# Patient Record
Sex: FEMALE | Race: WHITE | NOT HISPANIC OR LATINO | URBAN - METROPOLITAN AREA
[De-identification: names, ages, dates, MRNs, and addresses within clinical notes are randomized per-mention and may not be internally consistent; named-entity substitution may affect disease eponyms.]

---

## 2017-05-31 ENCOUNTER — IMPORTED ENCOUNTER (OUTPATIENT)
Dept: URBAN - METROPOLITAN AREA CLINIC 9 | Facility: CLINIC | Age: 72
End: 2017-05-31

## 2018-02-27 ENCOUNTER — IMPORTED ENCOUNTER (OUTPATIENT)
Dept: URBAN - METROPOLITAN AREA CLINIC 9 | Facility: CLINIC | Age: 73
End: 2018-02-27

## 2018-05-15 ENCOUNTER — IMPORTED ENCOUNTER (OUTPATIENT)
Dept: URBAN - METROPOLITAN AREA CLINIC 9 | Facility: CLINIC | Age: 73
End: 2018-05-15

## 2019-07-24 ENCOUNTER — IMPORTED ENCOUNTER (OUTPATIENT)
Dept: URBAN - METROPOLITAN AREA CLINIC 9 | Facility: CLINIC | Age: 74
End: 2019-07-24

## 2020-08-04 ENCOUNTER — IMPORTED ENCOUNTER (OUTPATIENT)
Dept: URBAN - METROPOLITAN AREA CLINIC 9 | Facility: CLINIC | Age: 75
End: 2020-08-04

## 2020-08-04 ENCOUNTER — ESTABLISHED PATIENT (OUTPATIENT)
Dept: URBAN - METROPOLITAN AREA CLINIC 14 | Facility: CLINIC | Age: 75
End: 2020-08-04

## 2020-08-04 PROBLEM — H02.831: Noted: 2020-08-04

## 2020-08-04 PROBLEM — H02.834: Noted: 2020-08-04

## 2020-08-04 PROBLEM — E11.9: Noted: 2020-08-04

## 2020-08-04 PROBLEM — H43.813: Noted: 2020-08-04

## 2021-10-03 ASSESSMENT — VISUAL ACUITY
OU_SC: J2
OD_SC: 20/40-2
OS_SC: 20/40-2

## 2021-10-03 ASSESSMENT — TONOMETRY
OS_IOP_MMHG: 13
OD_IOP_MMHG: 11

## 2021-10-07 ENCOUNTER — ESTABLISHED PATIENT (OUTPATIENT)
Dept: URBAN - METROPOLITAN AREA CLINIC 14 | Facility: CLINIC | Age: 76
End: 2021-10-07

## 2021-10-07 DIAGNOSIS — E11.9: ICD-10-CM

## 2021-10-07 DIAGNOSIS — H43.813: ICD-10-CM

## 2021-10-07 DIAGNOSIS — H52.4: ICD-10-CM

## 2021-10-07 DIAGNOSIS — H02.834: ICD-10-CM

## 2021-10-07 DIAGNOSIS — H02.831: ICD-10-CM

## 2021-10-07 PROCEDURE — 92250 FUNDUS PHOTOGRAPHY W/I&R: CPT

## 2021-10-07 PROCEDURE — 92134 CPTRZ OPH DX IMG PST SGM RTA: CPT

## 2021-10-07 PROCEDURE — 99214 OFFICE O/P EST MOD 30 MIN: CPT

## 2021-10-07 ASSESSMENT — VISUAL ACUITY
OS_SC: 20/70-1
OD_SC: 20/40-1
OU_SC: J2

## 2021-10-07 ASSESSMENT — TONOMETRY
OS_IOP_MMHG: 15
OD_IOP_MMHG: 12

## 2021-10-18 ASSESSMENT — TONOMETRY
OD_IOP_MMHG: 11
OD_IOP_MMHG: 16
OS_IOP_MMHG: 15
OD_IOP_MMHG: 16
OS_IOP_MMHG: 12
OD_IOP_MMHG: 11
OS_IOP_MMHG: 18
OS_IOP_MMHG: 13

## 2021-10-18 ASSESSMENT — VISUAL ACUITY
OS_CC: 20/25 SN
OS_SC: 20/50 -2 SN
OS_CC: 20/25 SN
OD_SC: 20/40 -2 SN
OD_SC: 20/40 -2 SN
OS_PH: 20/40 -2 SN
OD_CC: 20/30 + SN
OD_SC: 20/30 -2 SN
OD_SC: 20/25 - SN
OD_SC: 20/25 - SN
OD_CC: 20/25 SN
OS_SC: 20/40 SN
OS_SC: 20/40 -2 SN
OS_PH: 20/30 -2 SN
OD_CC: 20/25 SN
OS_CC: 20/25 -2 SN
OS_SC: 20/60 SN
OS_SC: 20/40 SN

## 2022-06-20 RX ORDER — LEVOCETIRIZINE DIHYDROCHLORIDE 5 MG/1
TABLET, FILM COATED ORAL
COMMUNITY

## 2022-06-20 RX ORDER — ZOLPIDEM TARTRATE 12.5 MG/1
TABLET, FILM COATED, EXTENDED RELEASE ORAL
COMMUNITY
Start: 2022-04-08 | End: 2022-08-02 | Stop reason: SDUPTHER

## 2022-06-20 RX ORDER — METFORMIN HYDROCHLORIDE 750 MG/1
TABLET, EXTENDED RELEASE ORAL
COMMUNITY

## 2022-06-20 RX ORDER — ATORVASTATIN CALCIUM 40 MG/1
TABLET, FILM COATED ORAL
COMMUNITY
End: 2022-08-09 | Stop reason: SINTOL

## 2022-08-03 DIAGNOSIS — F51.01 PRIMARY INSOMNIA: ICD-10-CM

## 2022-08-03 PROBLEM — K64.1 SECOND DEGREE HEMORRHOIDS: Status: ACTIVE | Noted: 2022-08-03

## 2022-08-03 PROBLEM — N28.89 KIDNEY MASS: Status: ACTIVE | Noted: 2022-08-03

## 2022-08-03 PROBLEM — E78.5 HYPERLIPIDEMIA: Status: ACTIVE | Noted: 2022-08-03

## 2022-08-03 PROBLEM — N32.81 OAB (OVERACTIVE BLADDER): Status: ACTIVE | Noted: 2022-08-03

## 2022-08-03 PROBLEM — M75.111 PARTIAL NONTRAUMATIC TEAR OF RIGHT ROTATOR CUFF: Status: ACTIVE | Noted: 2022-08-03

## 2022-08-03 PROBLEM — K21.9 GASTROESOPHAGEAL REFLUX DISEASE: Status: ACTIVE | Noted: 2022-08-03

## 2022-08-03 PROBLEM — R32 INCONTINENCE: Status: ACTIVE | Noted: 2022-08-03

## 2022-08-03 PROBLEM — J31.0 RHINITIS: Status: ACTIVE | Noted: 2022-08-03

## 2022-08-03 PROBLEM — G47.00 INSOMNIA: Status: ACTIVE | Noted: 2022-08-03

## 2022-08-03 PROBLEM — K55.9 ISCHEMIC COLITIS (HCC): Status: ACTIVE | Noted: 2022-08-03

## 2022-08-03 PROBLEM — I48.91 ATRIAL FIBRILLATION (HCC): Status: ACTIVE | Noted: 2022-08-03

## 2022-08-03 PROBLEM — E78.5 DYSLIPIDEMIA: Status: ACTIVE | Noted: 2022-08-03

## 2022-08-03 PROBLEM — N81.89 PELVIC FLOOR WEAKNESS IN FEMALE: Status: ACTIVE | Noted: 2022-08-03

## 2022-08-03 PROBLEM — M19.011 ARTHRITIS OF RIGHT ACROMIOCLAVICULAR JOINT: Status: ACTIVE | Noted: 2022-08-03

## 2022-08-03 PROBLEM — K52.9 COLITIS: Status: ACTIVE | Noted: 2022-08-03

## 2022-08-03 PROBLEM — E78.49 OTHER HYPERLIPIDEMIA: Status: ACTIVE | Noted: 2022-08-03

## 2022-08-03 PROBLEM — E11.9 TYPE 2 DIABETES MELLITUS WITHOUT COMPLICATION (HCC): Status: ACTIVE | Noted: 2022-08-03

## 2022-08-03 PROBLEM — M75.51 BURSITIS OF RIGHT SHOULDER: Status: ACTIVE | Noted: 2022-08-03

## 2022-08-03 PROBLEM — L30.9 ECZEMA: Status: ACTIVE | Noted: 2022-08-03

## 2022-08-03 RX ORDER — AZELASTINE 1 MG/ML
SPRAY, METERED NASAL
COMMUNITY
Start: 2022-07-05

## 2022-08-03 RX ORDER — PROGESTERONE 200 MG/1
CAPSULE ORAL
COMMUNITY
Start: 2022-07-19 | End: 2022-10-28

## 2022-08-03 RX ORDER — ESOMEPRAZOLE MAGNESIUM 40 MG/1
CAPSULE, DELAYED RELEASE ORAL
COMMUNITY

## 2022-08-03 RX ORDER — DIAZEPAM 2 MG/1
TABLET ORAL
COMMUNITY
Start: 2022-07-28 | End: 2022-09-12

## 2022-08-03 RX ORDER — ZOLPIDEM TARTRATE 12.5 MG/1
12.5 TABLET, FILM COATED, EXTENDED RELEASE ORAL NIGHTLY PRN
Qty: 90 TABLET | Refills: 0 | Status: SHIPPED | OUTPATIENT
Start: 2022-08-03 | End: 2022-08-03

## 2022-08-03 RX ORDER — SOLIFENACIN SUCCINATE 5 MG/1
TABLET, FILM COATED ORAL
COMMUNITY
Start: 2022-07-27 | End: 2022-08-09 | Stop reason: SDUPTHER

## 2022-08-03 RX ORDER — ZOLPIDEM TARTRATE 12.5 MG/1
12.5 TABLET, FILM COATED, EXTENDED RELEASE ORAL NIGHTLY PRN
Qty: 90 TABLET | Refills: 0 | Status: SHIPPED | OUTPATIENT
Start: 2022-08-03 | End: 2022-09-12

## 2022-08-03 NOTE — TELEPHONE ENCOUNTER
Received call from [facility], medication Ambien 12.5m needs updated to read 1 tablet at bedtime. Pharmacy received instuctions of 1 tablet in the morning. Needs this correct prior to giving to patient.

## 2022-08-08 PROBLEM — E78.5 DYSLIPIDEMIA: Status: RESOLVED | Noted: 2022-08-03 | Resolved: 2022-08-08

## 2022-08-09 PROBLEM — M54.40 LUMBAGO WITH SCIATICA: Status: ACTIVE | Noted: 2018-06-20

## 2022-08-09 PROBLEM — M51.26 DISPLACEMENT OF LUMBAR INTERVERTEBRAL DISC WITHOUT MYELOPATHY: Status: ACTIVE | Noted: 2018-06-20

## 2022-08-09 PROBLEM — M54.17 LUMBOSACRAL RADICULITIS: Status: ACTIVE | Noted: 2018-06-20

## 2022-08-09 PROBLEM — N28.1 SIMPLE RENAL CYST: Status: ACTIVE | Noted: 2018-06-20

## 2022-08-17 PROBLEM — I48.0 PAROXYSMAL ATRIAL FIBRILLATION (HCC): Status: ACTIVE | Noted: 2022-08-03

## 2022-09-27 ENCOUNTER — ESTABLISHED PATIENT (OUTPATIENT)
Dept: URBAN - METROPOLITAN AREA CLINIC 14 | Facility: CLINIC | Age: 77
End: 2022-09-27

## 2022-09-27 DIAGNOSIS — H01.02B: ICD-10-CM

## 2022-09-27 DIAGNOSIS — E11.9: ICD-10-CM

## 2022-09-27 DIAGNOSIS — D23.122: ICD-10-CM

## 2022-09-27 DIAGNOSIS — D23.112: ICD-10-CM

## 2022-09-27 DIAGNOSIS — H01.02A: ICD-10-CM

## 2022-09-27 PROCEDURE — 99214 OFFICE O/P EST MOD 30 MIN: CPT

## 2022-09-27 PROCEDURE — 92285 EXTERNAL OCULAR PHOTOGRAPHY: CPT

## 2022-09-27 PROCEDURE — 67840 REMOVE EYELID LESION: CPT

## 2022-09-27 ASSESSMENT — TONOMETRY
OS_IOP_MMHG: 16
OD_IOP_MMHG: 14

## 2022-09-27 ASSESSMENT — VISUAL ACUITY
OD_SC: 20/30
OS_SC: 20/60

## 2022-10-10 PROBLEM — G45.9 TRANSIENT ISCHEMIC ATTACK: Status: ACTIVE | Noted: 2019-06-30

## 2022-10-10 PROBLEM — S62.309A FRACTURE OF METACARPAL BONE: Status: ACTIVE | Noted: 2021-12-05

## 2022-10-10 PROBLEM — M25.519 SHOULDER PAIN: Status: ACTIVE | Noted: 2022-10-10

## 2022-10-10 PROBLEM — S90.934A UNSPECIFIED SUPERFICIAL INJURY OF RIGHT LESSER TOE(S), INITIAL ENCOUNTER: Status: ACTIVE | Noted: 2021-12-17

## 2022-10-10 PROBLEM — I48.91 ATRIAL FIBRILLATION (HCC): Status: ACTIVE | Noted: 2022-10-10

## 2022-10-10 PROBLEM — E11.9 TYPE 2 DIABETES MELLITUS (HCC): Status: ACTIVE | Noted: 2022-10-10
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
## 2022-10-10 PROBLEM — J30.9 ALLERGIC RHINITIS: Status: ACTIVE | Noted: 2022-10-10

## 2022-10-25 ENCOUNTER — ESTABLISHED PATIENT (OUTPATIENT)
Dept: URBAN - METROPOLITAN AREA CLINIC 14 | Facility: CLINIC | Age: 77
End: 2022-10-25

## 2022-10-25 DIAGNOSIS — H43.813: ICD-10-CM

## 2022-10-25 DIAGNOSIS — E11.9: ICD-10-CM

## 2022-10-25 PROCEDURE — 99214 OFFICE O/P EST MOD 30 MIN: CPT

## 2022-10-25 PROCEDURE — 92134 CPTRZ OPH DX IMG PST SGM RTA: CPT

## 2022-10-25 PROCEDURE — 92250 FUNDUS PHOTOGRAPHY W/I&R: CPT

## 2022-10-25 ASSESSMENT — VISUAL ACUITY
OD_SC: 20/50+2
OU_SC: 20/50
OS_SC: 20/50-1

## 2022-10-25 ASSESSMENT — TONOMETRY
OD_IOP_MMHG: 13
OS_IOP_MMHG: 15

## 2024-02-13 ENCOUNTER — ESTABLISHED PATIENT (OUTPATIENT)
Dept: URBAN - METROPOLITAN AREA CLINIC 14 | Facility: CLINIC | Age: 79
End: 2024-02-13

## 2024-02-13 DIAGNOSIS — E11.9: ICD-10-CM

## 2024-02-13 DIAGNOSIS — H52.4: ICD-10-CM

## 2024-02-13 DIAGNOSIS — H43.813: ICD-10-CM

## 2024-02-13 PROCEDURE — 92015 DETERMINE REFRACTIVE STATE: CPT

## 2024-02-13 PROCEDURE — 92014 COMPRE OPH EXAM EST PT 1/>: CPT

## 2024-02-13 PROCEDURE — 92250 FUNDUS PHOTOGRAPHY W/I&R: CPT

## 2024-02-13 ASSESSMENT — TONOMETRY
OS_IOP_MMHG: 13
OD_IOP_MMHG: 14

## 2024-02-13 ASSESSMENT — VISUAL ACUITY
OU_SC: J3
OS_SC: 20/50
OD_SC: 20/40

## 2025-03-31 ENCOUNTER — COMPREHENSIVE EXAM (OUTPATIENT)
Age: 80
End: 2025-03-31

## 2025-03-31 DIAGNOSIS — H35.3131: ICD-10-CM

## 2025-03-31 DIAGNOSIS — H01.02B: ICD-10-CM

## 2025-03-31 DIAGNOSIS — H02.835: ICD-10-CM

## 2025-03-31 DIAGNOSIS — H43.813: ICD-10-CM

## 2025-03-31 DIAGNOSIS — H01.02A: ICD-10-CM

## 2025-03-31 DIAGNOSIS — H02.832: ICD-10-CM

## 2025-03-31 DIAGNOSIS — H35.721: ICD-10-CM

## 2025-03-31 DIAGNOSIS — E11.9: ICD-10-CM

## 2025-03-31 DIAGNOSIS — H02.831: ICD-10-CM

## 2025-03-31 DIAGNOSIS — H52.223: ICD-10-CM

## 2025-03-31 DIAGNOSIS — H02.834: ICD-10-CM

## 2025-03-31 PROCEDURE — 92014 COMPRE OPH EXAM EST PT 1/>: CPT

## 2025-03-31 PROCEDURE — 92134 CPTRZ OPH DX IMG PST SGM RTA: CPT

## 2025-03-31 PROCEDURE — 92015 DETERMINE REFRACTIVE STATE: CPT

## 2025-04-03 ENCOUNTER — COMPREHENSIVE EXAM (OUTPATIENT)
Age: 80
End: 2025-04-03

## 2025-04-03 DIAGNOSIS — E11.9: ICD-10-CM

## 2025-04-03 DIAGNOSIS — H35.3122: ICD-10-CM

## 2025-04-03 DIAGNOSIS — H43.813: ICD-10-CM

## 2025-04-03 DIAGNOSIS — H35.3211: ICD-10-CM

## 2025-04-03 PROCEDURE — 67028 INJECTION EYE DRUG: CPT

## 2025-04-03 PROCEDURE — 92134 CPTRZ OPH DX IMG PST SGM RTA: CPT

## 2025-04-03 PROCEDURE — 92201 OPSCPY EXTND RTA DRAW UNI/BI: CPT

## 2025-04-03 PROCEDURE — 99214 OFFICE O/P EST MOD 30 MIN: CPT | Mod: 25

## 2025-04-21 ENCOUNTER — EMERGENCY VISIT (OUTPATIENT)
Age: 80
End: 2025-04-21

## 2025-04-21 DIAGNOSIS — H43.813: ICD-10-CM

## 2025-04-21 DIAGNOSIS — H35.3211: ICD-10-CM

## 2025-04-21 DIAGNOSIS — H35.3122: ICD-10-CM

## 2025-04-21 PROCEDURE — 92014 COMPRE OPH EXAM EST PT 1/>: CPT

## 2025-05-01 ENCOUNTER — CLINIC PROCEDURE ONLY (OUTPATIENT)
Age: 80
End: 2025-05-01

## 2025-05-01 DIAGNOSIS — H35.3211: ICD-10-CM

## 2025-05-01 DIAGNOSIS — H43.813: ICD-10-CM

## 2025-05-01 DIAGNOSIS — E11.9: ICD-10-CM

## 2025-05-01 DIAGNOSIS — H35.3122: ICD-10-CM

## 2025-05-01 PROCEDURE — 99212 OFFICE O/P EST SF 10 MIN: CPT | Mod: 25

## 2025-05-01 PROCEDURE — J2777PFS VABYSMO PFS: Mod: JZ

## 2025-05-01 PROCEDURE — 92134 CPTRZ OPH DX IMG PST SGM RTA: CPT

## 2025-05-01 PROCEDURE — 67028 INJECTION EYE DRUG: CPT

## 2025-06-05 ENCOUNTER — CLINIC PROCEDURE ONLY (OUTPATIENT)
Age: 80
End: 2025-06-05

## 2025-06-05 DIAGNOSIS — H35.3122: ICD-10-CM

## 2025-06-05 DIAGNOSIS — E11.9: ICD-10-CM

## 2025-06-05 DIAGNOSIS — H35.3211: ICD-10-CM

## 2025-06-05 DIAGNOSIS — H43.813: ICD-10-CM

## 2025-06-05 PROCEDURE — 67028 INJECTION EYE DRUG: CPT

## 2025-06-05 PROCEDURE — 99212 OFFICE O/P EST SF 10 MIN: CPT | Mod: 25

## 2025-06-05 PROCEDURE — J2777PFS VABYSMO PFS: Mod: JZ

## 2025-06-05 PROCEDURE — 92134 CPTRZ OPH DX IMG PST SGM RTA: CPT

## 2025-07-24 ENCOUNTER — CLINIC PROCEDURE ONLY (OUTPATIENT)
Age: 80
End: 2025-07-24

## 2025-07-24 DIAGNOSIS — H35.3211: ICD-10-CM

## 2025-07-24 DIAGNOSIS — H35.3122: ICD-10-CM

## 2025-07-24 PROCEDURE — J2777PFS VABYSMO PFS: Mod: JZ

## 2025-07-24 PROCEDURE — 92134 CPTRZ OPH DX IMG PST SGM RTA: CPT

## 2025-07-24 PROCEDURE — 67028 INJECTION EYE DRUG: CPT
